# Patient Record
Sex: FEMALE | Race: WHITE | NOT HISPANIC OR LATINO | URBAN - METROPOLITAN AREA
[De-identification: names, ages, dates, MRNs, and addresses within clinical notes are randomized per-mention and may not be internally consistent; named-entity substitution may affect disease eponyms.]

---

## 2017-04-04 ENCOUNTER — OFFICE (OUTPATIENT)
Dept: URBAN - METROPOLITAN AREA CLINIC 78 | Facility: CLINIC | Age: 67
End: 2017-04-04

## 2017-04-04 PROCEDURE — 00031: CPT

## 2017-04-18 ENCOUNTER — AMBULATORY SURGICAL CENTER (OUTPATIENT)
Dept: URBAN - METROPOLITAN AREA SURGERY 21 | Facility: SURGERY | Age: 67
End: 2017-04-18
Payer: COMMERCIAL

## 2017-04-18 DIAGNOSIS — Z86.010 PERSONAL HISTORY OF COLONIC POLYPS: ICD-10-CM

## 2017-04-18 DIAGNOSIS — D12.0 BENIGN NEOPLASM OF CECUM: ICD-10-CM

## 2017-04-18 DIAGNOSIS — D12.2 BENIGN NEOPLASM OF ASCENDING COLON: ICD-10-CM

## 2017-04-18 DIAGNOSIS — D12.4 BENIGN NEOPLASM OF DESCENDING COLON: ICD-10-CM

## 2017-04-18 PROCEDURE — 45380 COLONOSCOPY AND BIOPSY: CPT | Mod: PT

## 2021-06-15 ENCOUNTER — PREPPED CHART (OUTPATIENT)
Dept: URBAN - METROPOLITAN AREA CLINIC 66 | Facility: CLINIC | Age: 71
End: 2021-06-15

## 2021-06-15 PROBLEM — H35.3131 DRY AMD, EARLY DRY STAGE: Status: STABILIZING | Noted: 2021-06-15

## 2021-06-15 PROBLEM — H35.373 HISTORY OF MACULAR PUCKER: Noted: 2021-06-15

## 2021-06-15 PROBLEM — H43.812 POSTERIOR VITREOUS DETACHMENT: Status: STABILIZING | Noted: 2021-06-15

## 2021-06-15 PROBLEM — H35.371 EPIRETINAL MEMBRANE: Noted: 2021-06-15

## 2021-06-15 PROBLEM — H25.12 NS CATARACT: Noted: 2021-06-15

## 2021-06-15 PROBLEM — H35.3131 DRY AMD, EARLY DRY STAGE: Noted: 2021-06-15

## 2021-06-15 PROBLEM — Z98.890 S/P PPV: Noted: 2019-03-22

## 2021-06-15 PROBLEM — H43.812 POSTERIOR VITREOUS DETACHMENT: Noted: 2021-06-15

## 2022-05-25 ASSESSMENT — TONOMETRY
OS_IOP_MMHG: 16
OD_IOP_MMHG: 16

## 2022-05-25 ASSESSMENT — VISUAL ACUITY
OS_SC: 20/20
OD_SC: 20/25-1

## 2022-06-10 ENCOUNTER — OFFICE (OUTPATIENT)
Dept: URBAN - METROPOLITAN AREA CLINIC 34 | Facility: CLINIC | Age: 72
End: 2022-06-10
Payer: COMMERCIAL

## 2022-06-10 VITALS
HEART RATE: 51 BPM | HEIGHT: 65 IN | DIASTOLIC BLOOD PRESSURE: 67 MMHG | WEIGHT: 141 LBS | TEMPERATURE: 97.4 F | SYSTOLIC BLOOD PRESSURE: 103 MMHG

## 2022-06-10 DIAGNOSIS — I48.0 PAROXYSMAL ATRIAL FIBRILLATION: ICD-10-CM

## 2022-06-10 DIAGNOSIS — K57.30 DIVERTICULOSIS OF LARGE INTESTINE WITHOUT PERFORATION OR ABS: ICD-10-CM

## 2022-06-10 DIAGNOSIS — Z86.010 PERSONAL HISTORY OF COLONIC POLYPS: ICD-10-CM

## 2022-06-10 PROCEDURE — 99204 OFFICE O/P NEW MOD 45 MIN: CPT | Performed by: PHYSICIAN ASSISTANT

## 2022-06-10 NOTE — SERVICEHPINOTES
Pt is a 72 yr old female here today to discuss a colonoscopy. Last colonoscopy 04/2017 showed TA, given a 5 yr recall. She denies constipation, diarrhea, abdominal pain, unexplained weight loss, blood in the stool, or blood upon wiping. Recent CT scan of the pelvis/urogram for hematuria showed diverticulosis (severe) but no diverticulitis. She has a fib and takes rythmol, toprol, and xarelto for this. Dx a few yrs ago. Her a fib is controlled by medication. Her cardiologist is Dr. Cerda. No chest pain or SOB. No other heart issues.

## 2022-06-16 ENCOUNTER — ESTABLISHED COMPREHENSIVE EXAM (OUTPATIENT)
Dept: URBAN - METROPOLITAN AREA CLINIC 80 | Facility: CLINIC | Age: 72
End: 2022-06-16

## 2022-06-16 DIAGNOSIS — H35.3131: ICD-10-CM

## 2022-06-16 DIAGNOSIS — H43.812: ICD-10-CM

## 2022-06-16 DIAGNOSIS — H35.373: ICD-10-CM

## 2022-06-16 PROCEDURE — 92134 CPTRZ OPH DX IMG PST SGM RTA: CPT

## 2022-06-16 PROCEDURE — 92202 OPSCPY EXTND ON/MAC DRAW: CPT

## 2022-06-16 PROCEDURE — 92014 COMPRE OPH EXAM EST PT 1/>: CPT

## 2022-06-16 ASSESSMENT — VISUAL ACUITY
OS_SC: 20/20
OD_SC: 20/25-2

## 2022-06-16 ASSESSMENT — TONOMETRY
OD_IOP_MMHG: 15
OS_IOP_MMHG: 15

## 2022-11-03 ENCOUNTER — OFFICE (OUTPATIENT)
Dept: URBAN - METROPOLITAN AREA CLINIC 79 | Facility: CLINIC | Age: 72
End: 2022-11-03

## 2022-11-03 PROCEDURE — 00031: CPT | Performed by: INTERNAL MEDICINE

## 2022-12-15 ENCOUNTER — FOLLOW UP (OUTPATIENT)
Dept: URBAN - METROPOLITAN AREA CLINIC 80 | Facility: CLINIC | Age: 72
End: 2022-12-15

## 2022-12-15 DIAGNOSIS — H35.3131: ICD-10-CM

## 2022-12-15 DIAGNOSIS — H35.373: ICD-10-CM

## 2022-12-15 DIAGNOSIS — H25.12: ICD-10-CM

## 2022-12-15 DIAGNOSIS — H43.812: ICD-10-CM

## 2022-12-15 PROCEDURE — 92202 OPSCPY EXTND ON/MAC DRAW: CPT

## 2022-12-15 PROCEDURE — 92014 COMPRE OPH EXAM EST PT 1/>: CPT

## 2022-12-15 PROCEDURE — 92134 CPTRZ OPH DX IMG PST SGM RTA: CPT

## 2022-12-15 ASSESSMENT — TONOMETRY
OD_IOP_MMHG: 15
OS_IOP_MMHG: 18

## 2022-12-15 ASSESSMENT — VISUAL ACUITY
OD_SC: 20/30-2
OS_SC: 20/20

## 2023-06-02 ENCOUNTER — OFFICE (OUTPATIENT)
Dept: URBAN - METROPOLITAN AREA CLINIC 79 | Facility: CLINIC | Age: 73
End: 2023-06-02
Payer: COMMERCIAL

## 2023-06-02 VITALS
HEART RATE: 48 BPM | WEIGHT: 135 LBS | DIASTOLIC BLOOD PRESSURE: 82 MMHG | SYSTOLIC BLOOD PRESSURE: 149 MMHG | HEIGHT: 65 IN | TEMPERATURE: 98.4 F

## 2023-06-02 DIAGNOSIS — Z86.010 PERSONAL HISTORY OF COLONIC POLYPS: ICD-10-CM

## 2023-06-02 DIAGNOSIS — I48.0 PAROXYSMAL ATRIAL FIBRILLATION: ICD-10-CM

## 2023-06-02 PROCEDURE — 99213 OFFICE O/P EST LOW 20 MIN: CPT | Performed by: PHYSICIAN ASSISTANT

## 2023-06-02 NOTE — SERVICEHPINOTES
Pt is here to discuss a surveillance colonoscopy. Her last colonoscopy was in 2017 and she was given a 5 yr recall d/t hx of polyps. She was scheduled for a colonoscopy 01/23. After drinking her last dose of prep she went into a fib. She wants to go through with another colonoscopy again given her polyp history. She has been working with her cardiologist to optimally manage her a fib. She will see cardiology again 7/10/23. She will again d/w them the a fib and prepping for a colonoscopy--will use sutab again. Pertinent negative symptoms include palpitation/fluttering of heart, pain, shortness of breath while exercising, constipation, diarrhea, pain, reflux (heartburn), rectal bleeding, night sweats, weight loss.   No other GI related complaints today.

## 2023-08-17 ENCOUNTER — FOLLOW UP (OUTPATIENT)
Dept: URBAN - METROPOLITAN AREA CLINIC 80 | Facility: CLINIC | Age: 73
End: 2023-08-17

## 2023-08-17 DIAGNOSIS — H35.3131: ICD-10-CM

## 2023-08-17 DIAGNOSIS — H43.812: ICD-10-CM

## 2023-08-17 DIAGNOSIS — H35.373: ICD-10-CM

## 2023-08-17 PROCEDURE — 92014 COMPRE OPH EXAM EST PT 1/>: CPT

## 2023-08-17 PROCEDURE — 92202 OPSCPY EXTND ON/MAC DRAW: CPT

## 2023-08-17 PROCEDURE — 92134 CPTRZ OPH DX IMG PST SGM RTA: CPT

## 2023-08-17 ASSESSMENT — VISUAL ACUITY
OD_PH: 20/25-2
OD_SC: 20/30+1
OS_SC: 20/20

## 2023-08-17 ASSESSMENT — TONOMETRY
OS_IOP_MMHG: 16
OD_IOP_MMHG: 17

## 2023-11-06 ENCOUNTER — AMBULATORY SURGICAL CENTER (OUTPATIENT)
Dept: URBAN - METROPOLITAN AREA SURGERY 23 | Facility: SURGERY | Age: 73
End: 2023-11-06
Payer: COMMERCIAL

## 2023-11-06 DIAGNOSIS — K63.5 POLYP OF COLON: ICD-10-CM

## 2023-11-06 DIAGNOSIS — D12.3 BENIGN NEOPLASM OF TRANSVERSE COLON: ICD-10-CM

## 2023-11-06 DIAGNOSIS — D12.8 BENIGN NEOPLASM OF RECTUM: ICD-10-CM

## 2023-11-06 DIAGNOSIS — D12.2 BENIGN NEOPLASM OF ASCENDING COLON: ICD-10-CM

## 2023-11-06 DIAGNOSIS — Z86.010 PERSONAL HISTORY OF COLONIC POLYPS: ICD-10-CM

## 2023-11-06 DIAGNOSIS — D12.4 BENIGN NEOPLASM OF DESCENDING COLON: ICD-10-CM

## 2023-11-06 DIAGNOSIS — D12.5 BENIGN NEOPLASM OF SIGMOID COLON: ICD-10-CM

## 2023-11-06 DIAGNOSIS — Z09 ENCOUNTER FOR FOLLOW-UP EXAMINATION AFTER COMPLETED TREATMEN: ICD-10-CM

## 2023-11-06 PROCEDURE — 45380 COLONOSCOPY AND BIOPSY: CPT | Mod: 59 | Performed by: INTERNAL MEDICINE

## 2023-11-06 PROCEDURE — 45385 COLONOSCOPY W/LESION REMOVAL: CPT | Performed by: INTERNAL MEDICINE

## 2024-09-05 ENCOUNTER — FOLLOW UP (OUTPATIENT)
Dept: URBAN - METROPOLITAN AREA CLINIC 80 | Facility: CLINIC | Age: 74
End: 2024-09-05

## 2024-09-05 DIAGNOSIS — H35.373: ICD-10-CM

## 2024-09-05 DIAGNOSIS — H43.812: ICD-10-CM

## 2024-09-05 DIAGNOSIS — H35.3131: ICD-10-CM

## 2024-09-05 PROCEDURE — 92202 OPSCPY EXTND ON/MAC DRAW: CPT

## 2024-09-05 PROCEDURE — 92134 CPTRZ OPH DX IMG PST SGM RTA: CPT

## 2024-09-05 PROCEDURE — 92014 COMPRE OPH EXAM EST PT 1/>: CPT

## 2024-09-05 ASSESSMENT — TONOMETRY
OS_IOP_MMHG: 15
OD_IOP_MMHG: 16

## 2024-09-05 ASSESSMENT — VISUAL ACUITY
OS_SC: 20/20-1
OD_SC: 20/20-1

## 2024-12-12 ENCOUNTER — FOLLOW UP (OUTPATIENT)
Dept: URBAN - METROPOLITAN AREA CLINIC 80 | Facility: CLINIC | Age: 74
End: 2024-12-12

## 2024-12-12 DIAGNOSIS — H35.3131: ICD-10-CM

## 2024-12-12 DIAGNOSIS — H35.373: ICD-10-CM

## 2024-12-12 DIAGNOSIS — H43.812: ICD-10-CM

## 2024-12-12 PROCEDURE — 92134 CPTRZ OPH DX IMG PST SGM RTA: CPT

## 2024-12-12 PROCEDURE — 92014 COMPRE OPH EXAM EST PT 1/>: CPT

## 2024-12-12 PROCEDURE — 92202 OPSCPY EXTND ON/MAC DRAW: CPT

## 2024-12-12 ASSESSMENT — TONOMETRY
OD_IOP_MMHG: 19
OS_IOP_MMHG: 18

## 2024-12-12 ASSESSMENT — VISUAL ACUITY
OS_SC: 20/20
OD_SC: 20/30+2